# Patient Record
Sex: MALE | Race: BLACK OR AFRICAN AMERICAN | NOT HISPANIC OR LATINO | Employment: OTHER | URBAN - METROPOLITAN AREA
[De-identification: names, ages, dates, MRNs, and addresses within clinical notes are randomized per-mention and may not be internally consistent; named-entity substitution may affect disease eponyms.]

---

## 2017-06-15 ENCOUNTER — DOCUMENTATION ONLY (OUTPATIENT)
Dept: FAMILY MEDICINE | Facility: CLINIC | Age: 62
End: 2017-06-15

## 2017-06-15 NOTE — PROGRESS NOTES
Pre-Visit Chart Review  For Appointment Scheduled on 6-22-17    Health Maintenance Due   Topic Date Due    TETANUS VACCINE  02/26/1972    Hemoglobin A1c  10/12/2016    Colonoscopy  01/26/2017    Foot Exam  04/11/2017    Eye Exam  04/11/2017    Lipid Panel  04/12/2017

## 2017-06-22 ENCOUNTER — OFFICE VISIT (OUTPATIENT)
Dept: FAMILY MEDICINE | Facility: CLINIC | Age: 62
End: 2017-06-22
Payer: COMMERCIAL

## 2017-06-22 VITALS
HEART RATE: 84 BPM | BODY MASS INDEX: 34.13 KG/M2 | DIASTOLIC BLOOD PRESSURE: 84 MMHG | WEIGHT: 257.5 LBS | SYSTOLIC BLOOD PRESSURE: 136 MMHG | RESPIRATION RATE: 16 BRPM | HEIGHT: 73 IN | TEMPERATURE: 98 F | OXYGEN SATURATION: 97 %

## 2017-06-22 DIAGNOSIS — Z12.11 COLON CANCER SCREENING: ICD-10-CM

## 2017-06-22 DIAGNOSIS — E11.9 CONTROLLED TYPE 2 DIABETES MELLITUS WITHOUT COMPLICATION, WITHOUT LONG-TERM CURRENT USE OF INSULIN: ICD-10-CM

## 2017-06-22 DIAGNOSIS — Z76.89 ESTABLISHING CARE WITH NEW DOCTOR, ENCOUNTER FOR: Primary | ICD-10-CM

## 2017-06-22 DIAGNOSIS — M50.30 DDD (DEGENERATIVE DISC DISEASE), CERVICAL: ICD-10-CM

## 2017-06-22 PROCEDURE — 99999 PR PBB SHADOW E&M-EST. PATIENT-LVL IV: CPT | Mod: PBBFAC,,, | Performed by: FAMILY MEDICINE

## 2017-06-22 PROCEDURE — 99386 PREV VISIT NEW AGE 40-64: CPT | Mod: S$GLB,,, | Performed by: FAMILY MEDICINE

## 2017-06-22 RX ORDER — NAPROXEN 250 MG/1
250 TABLET ORAL DAILY PRN
COMMUNITY

## 2017-06-22 NOTE — PATIENT INSTRUCTIONS
Walking for Fitness  Fitness walking has something for everyone, even people who are already fit. Walking is one of the safest ways to condition your body aerobically. It can boost energy, help you lose weight, and reduce stress.    Physical benefits  · Walking strengthens your heart and lungs, and tones your muscles.  · When walking, your feet land with less impact than in other sports. This reduces chances of muscle, bone, and joint injury.  · Regular walking improves your cholesterol levels and lowers your risk of heart disease. And it helps you control your blood sugar if you have diabetes.  · Walking is a weight-bearing activity, which helps maintain bone density. This can help prevent osteoporosis.  Personal rewards  · Taking walks can help you relax and manage stress. And fitness walking may make you feel better about yourself.  · Walking can help you sleep better at night and make you less likely to be depressed.  · Regular walking may help maintain your memory as you get older.  · Walking is a great way to spend extra time with friends and family members. Be sure to invite your dog along!  Q&A about fitness walking  Q: Will walking keep me fit?  A: Yes. Regular walking at the right pace gives you all the benefits of other aerobic activities, such as jogging and swimming.  Q: Will walking help me lose weight and keep it off?  A: Yes. Per mile, walking can burn as many calories as jogging. Your health care provider can help work walking into your weight-loss plan.  Q: Is walking safe for my health?  A: Yes. Walking is safe if you have high blood pressure, diabetes, heart disease, or other conditions. Talk to your health care provider before you start.  Date Last Reviewed: 5/9/2015  © 0134-9685 [employer]. [address] [place] [address], [place] [address]. All rights reserved. This information is not intended as a substitute for professional medical care. Always follow your healthcare professional's  instructions.        Weight Management: Getting Started  Healthy bodies come in all shapes and sizes. Not all bodies are made to be thin. For some people, a healthy weight is higher than the average weight listed on weight charts. Your healthcare provider can help you decide on a healthy weight for you.    Reasons to lose weight  Losing weight can help with some health problems, such as high blood pressure, heart disease, diabetes, sleep apnea, and arthritis. You may also feel more energy.  Set your long-term goal  Your goal doesn't even have to be a specific weight. You may decide on a fitness goal (such as being able to walk 10 miles a week), or a health goal (such as lowering your blood pressure). Choose a goal that is measurable and reasonable, so you know when you've reached it. A goal of reaching a BMI of less than 25 is not always reasonable (or possible).   Make an action plan  Habits dont change overnight. Setting your goals too high can leave you feeling discouraged if you cant reach them. Be realistic. Choose one or two small changes you can make now. Set an action plan for how you are going to make these changes. When you can stick to this plan, keep making a few more small changes. Taking small steps will help you stay on the path to success.  Track your progress  Write down your goals. Then, keep a daily record of your progress. Write down what you eat and how active you are. This record lets you look back on how much youve done. It may also help when youre feeling frustrated. Reward yourself for success. Even if you dont reach every goal, give yourself credit for what you do get done.  Get support  Encouragement from others can help make losing weight easier. Ask your family members and friends for support. They may even want to join you. Also look to your healthcare provider, registered dietitian, and  for help. Your local hospital can give you more information about  nutrition, exercise, and weight loss.  Date Last Reviewed: 1/31/2016 © 2000-2016 "[employer]. [address] [place] [address], [place] [address]. All rights reserved. This information is not intended as a substitute for professional medical care. Always follow your healthcare professional's instructions.        Controlling High Blood Pressure  High blood pressure (hypertension) is often called the silent killer. This is because many people who have it dont know it. High blood pressure is defined as 140/90 mm Hg or higher. Know your blood pressure and remember to check it regularly. Doing so can save your life. Here are some things you can do to help control your blood pressure.    Choose heart-healthy foods  · Select low-salt, low-fat foods. Limit sodium intake to 2,400 mg per day or the amount suggested by your healthcare provider.  · Limit canned, dried, cured, packaged, and fast foods. These can contain a lot of salt.  · Eat 8 to 10 servings of fruits and vegetables every day.  · Choose lean meats, fish, or chicken.  · Eat whole-grain pasta, brown rice, and beans.  · Eat 2 to 3 servings of low-fat or fat-free dairy products.  · Ask your doctor about the DASH eating plan. This plan helps reduce blood pressure.  · When you go to a restaurant, ask that your meal be prepared with no added salt.  Maintain a healthy weight  · Ask your healthcare provider how many calories to eat a day. Then stick to that number.  · Ask your healthcare provider what weight range is healthiest for you. If you are overweight, a weight loss of only 3% to 5% of your body weight can help lower blood pressure. Generally, a good weight loss goal is to lose 10% of your body weight in a year.  · Limit snacks and sweets.  · Get regular exercise.  Get up and get active  · Choose activities you enjoy. Find ones you can do with friends or family. This includes bicycling, dancing, walking, and jogging.  · Park farther away from building  entrances.  · Use stairs instead of the elevator.  · When you can, walk or bike instead of driving.  · [place] leaves, garden, or do household repairs.  · Be active at a moderate to vigorous level of physical activity for at least 40 minutes for a minimum of 3 to 4 days a week.   Manage stress  · Make time to relax and enjoy life. Find time to laugh.  · Communicate your concerns with your loved ones and your healthcare provider.  · Visit with family and friends, and keep up with hobbies.  Limit alcohol and quit smoking  · Men should have no more than 2 drinks per day.  · Women should have no more than 1 drink per day.  · Talk with your healthcare provider about quitting smoking. Smoking significantly increases your risk for heart disease and stroke. Ask your healthcare provider about community smoking cessation programs and other options.  Medicines  If lifestyle changes arent enough, your healthcare provider may prescribe high blood pressure medicine. Take all medicines as prescribed. If you have any questions about your medicines, ask your healthcare provider before stopping or changing them.   Date Last Reviewed: 4/27/2016 © 2000-2016 [employer]. [address] [place] [address], [place] [address]. All rights reserved. This information is not intended as a substitute for professional medical care. Always follow your healthcare professional's instructions.

## 2017-06-22 NOTE — PROGRESS NOTES
Subjective:       Patient ID: Kel Hammond Sr. is a 62 y.o. male.    Chief Complaint: Establish Care    62-year-old male comes in to [facility].  He has a history of degenerative disc disease in the neck and declined surgery for 2 herniated disc.  Conservative treatment has managed to keep it under good control.  He has a history of type 2 diabetes controlled with diet and exercise.  He has arthritis of the knees and is status post a right anterior cruciate ligament repair and later a right total knee arthroplasty.  He is prehypertensive and has an extensive family history of diabetes and hypertension.  He has no active complaints at this time with fairly well controlled intrascapular and C7 area pain in the neck.  He takes the naproxen about once a week so it doesn't appear to be causing him any significant blood pressure issues.  His initial blood pressure was borderline the repeat was improved but still in the high normal range.    Past Medical History:  No date: DDD (degenerative disc disease), cervical  No date: Diabetes mellitus type II    Past Surgical History:  No date: JOINT REPLACEMENT      Comment: right total knee  No date: right ACL repair    Review of patient's family history indicates:    Diabetes                       Father                    Heart disease                  Father                    Diabetes                       Mother                    Hypertension                   Mother                    Diabetes                       Sister                    Hypertension                   Sister                    No Known Problems              Son                       Diabetes                       Maternal Aunt             Heart disease                  Maternal Aunt             Heart disease                  Maternal Uncle            Heart disease                  Paternal Aunt             Heart disease                  Paternal Uncle              Comment: x2    Birth defects                   Paternal Uncle            Heart disease                  Maternal Grandmother      Heart disease                  Maternal Grandfather      Heart disease                  Paternal Grandmother      Heart disease                  Paternal Grandfather      Meningitis                     Brother                   Social History    Marital status:              Spouse name:                       Years of education:                 Number of children:               Occupational History  Occupation          Employer            Comment               Tech support        [employer]               Social History Main Topics    Smoking status: Never Smoker                                                                Smokeless tobacco: Never Used                        Alcohol use: Yes                Comment: social / sometimes     Drug use: No                Current Outpatient Prescriptions:     aspirin (ECOTRIN) 81 MG EC tablet, Take 1 tablet by mouth Daily., Disp: , Rfl:     naproxen (NAPROSYN) 250 MG tablet, Take 250 mg by mouth daily as needed., Disp: , Rfl:     TETANUS VACCINE due on 02/26/1972  Colonoscopy due on 02/26/2004        Review of Systems   Constitutional: Negative for activity change, chills, fever and unexpected weight change.   HENT: Negative for congestion, hearing loss, rhinorrhea and trouble swallowing.    Eyes: Negative for discharge and visual disturbance.   Respiratory: Negative for chest tightness, shortness of breath and wheezing.    Cardiovascular: Negative for chest pain and palpitations.   Gastrointestinal: Negative for anal bleeding, blood in stool, constipation, diarrhea and vomiting.   Endocrine: Negative for polydipsia and polyuria.   Genitourinary: Negative for difficulty urinating, hematuria and urgency.   Musculoskeletal: Negative for arthralgias, joint swelling and neck pain.   Skin: Negative for color change and rash.   Neurological: Negative for weakness and headaches.    Psychiatric/Behavioral: Negative for confusion, dysphoric mood and sleep disturbance. The patient is not nervous/anxious.        Objective:      Physical Exam   Constitutional: He is oriented to person, place, and time. He appears well-developed. No distress.   Good blood pressure control  Obese with BMI of 34.4   HENT:   Head: Normocephalic and atraumatic.   Right Ear: External ear normal.   Left Ear: External ear normal.   Nose: Nose normal.   Mouth/Throat: Oropharynx is clear and moist. No oropharyngeal exudate.   Eyes: Conjunctivae and EOM are normal. Pupils are equal, round, and reactive to light. Right eye exhibits no discharge. Left eye exhibits no discharge. No scleral icterus.   Neck: Normal range of motion. Neck supple. No JVD present. No tracheal deviation present. No thyromegaly present.   Cardiovascular: Normal rate, regular rhythm and normal heart sounds.  Exam reveals no gallop and no friction rub.    No murmur heard.  Pulses:       Dorsalis pedis pulses are 2+ on the right side, and 2+ on the left side.        Posterior tibial pulses are 2+ on the right side, and 2+ on the left side.   Pulmonary/Chest: Effort normal and breath sounds normal. No stridor. No respiratory distress. He has no wheezes. He has no rales. He exhibits no tenderness.   Abdominal: Soft. Bowel sounds are normal. He exhibits no distension and no mass. There is no tenderness. There is no rebound and no guarding.   Musculoskeletal: Normal range of motion. He exhibits no edema.        Right foot: There is normal range of motion and no deformity.        Left foot: There is normal range of motion and no deformity.   Feet:   Right Foot:   Protective Sensation: 10 sites tested. 10 sites sensed.   Skin Integrity: Negative for ulcer, blister, skin breakdown, erythema, warmth, callus or dry skin.   Left Foot:   Protective Sensation: 10 sites tested. 10 sites sensed.   Skin Integrity: Negative for ulcer, blister, skin breakdown, erythema,  warmth or callus.   Lymphadenopathy:     He has no cervical adenopathy.   Neurological: He is alert and oriented to person, place, and time. He has normal reflexes. He displays normal reflexes. No cranial nerve deficit. He exhibits normal muscle tone.   Skin: Skin is warm and dry. Capillary refill takes less than 2 seconds. No rash noted. He is not diaphoretic. No erythema.   Good hair growth on feet and toes   Psychiatric: He has a normal mood and affect. His behavior is normal. Judgment and thought content normal.   Nursing note and vitals reviewed.      Assessment:       1. Establishing care with new doctor, encounter for    2. Controlled type 2 diabetes mellitus without complication, without long-term current use of insulin    3. DDD (degenerative disc disease), cervical    4. Colon cancer screening    5. BMI 34.0-34.9,adult        Plan:       1. Establishing care with new doctor, encounter for  - Comprehensive metabolic panel; Future  - CBC auto differential; Future  - Lipid panel; Future    2. Controlled type 2 diabetes mellitus without complication, without long-term current use of insulin  - Comprehensive metabolic panel; Future  - Lipid panel; Future  - Hemoglobin A1c; Future  - Microalbumin/creatinine urine ratio; Future    3. DDD (degenerative disc disease), cervical  Stable/asymptomatic    4. Colon cancer screening  - Occult Blood Stool, CA Screen; Future  - Occult Blood Stool, CA Screen; Future  - Occult Blood Stool, CA Screen; Future    5. BMI 34.0-34.9,adult         Patient readiness: acceptance and barriers:none    During the course of the visit the patient was educated and counseled about the following:     Diabetes:  Discussed general issues about diabetes pathophysiology and management.  Discussed foot care.  Reminded to get yearly retinal exam.  Obesity:   General weight loss/lifestyle modification strategies discussed (elicit support from others; identify saboteurs; non-food rewards,  etc).  Informal exercise measures discussed, e.g. taking stairs instead of elevator.  Regular aerobic exercise program discussed.    Goals: Diabetes: Maintain Hemoglobin A1C below 7 and Obesity: Reduce calorie intake and BMI    Did patient meet goals/outcomes: Yes    The following self management tools provided: blood glucose log  excercise log    Patient Instructions (the written plan) was given to the patient/family.     Time spent with patient: 45 minutes

## 2017-06-23 ENCOUNTER — PATIENT MESSAGE (OUTPATIENT)
Dept: FAMILY MEDICINE | Facility: CLINIC | Age: 62
End: 2017-06-23

## 2017-06-23 ENCOUNTER — LAB VISIT (OUTPATIENT)
Dept: LAB | Facility: HOSPITAL | Age: 62
End: 2017-06-23
Attending: FAMILY MEDICINE
Payer: COMMERCIAL

## 2017-06-23 DIAGNOSIS — Z76.89 ESTABLISHING CARE WITH NEW DOCTOR, ENCOUNTER FOR: ICD-10-CM

## 2017-06-23 DIAGNOSIS — E11.9 CONTROLLED TYPE 2 DIABETES MELLITUS WITHOUT COMPLICATION, WITHOUT LONG-TERM CURRENT USE OF INSULIN: ICD-10-CM

## 2017-06-23 LAB
ALBUMIN SERPL BCP-MCNC: 3.6 G/DL
ALP SERPL-CCNC: 60 U/L
ALT SERPL W/O P-5'-P-CCNC: 22 U/L
ANION GAP SERPL CALC-SCNC: 7 MMOL/L
AST SERPL-CCNC: 20 U/L
BASOPHILS # BLD AUTO: 0.03 K/UL
BASOPHILS NFR BLD: 0.4 %
BILIRUB SERPL-MCNC: 1.1 MG/DL
BUN SERPL-MCNC: 14 MG/DL
CALCIUM SERPL-MCNC: 9.5 MG/DL
CHLORIDE SERPL-SCNC: 103 MMOL/L
CHOLEST/HDLC SERPL: 4.5 {RATIO}
CO2 SERPL-SCNC: 29 MMOL/L
CREAT SERPL-MCNC: 1 MG/DL
DIFFERENTIAL METHOD: ABNORMAL
EOSINOPHIL # BLD AUTO: 0.6 K/UL
EOSINOPHIL NFR BLD: 8.9 %
ERYTHROCYTE [DISTWIDTH] IN BLOOD BY AUTOMATED COUNT: 13.8 %
EST. GFR  (AFRICAN AMERICAN): >60 ML/MIN/1.73 M^2
EST. GFR  (NON AFRICAN AMERICAN): >60 ML/MIN/1.73 M^2
ESTIMATED AVG GLUCOSE: 117 MG/DL
GLUCOSE SERPL-MCNC: 105 MG/DL
HBA1C MFR BLD HPLC: 5.7 %
HCT VFR BLD AUTO: 44.7 %
HDL/CHOLESTEROL RATIO: 22 %
HDLC SERPL-MCNC: 186 MG/DL
HDLC SERPL-MCNC: 41 MG/DL
HGB BLD-MCNC: 14.8 G/DL
LDLC SERPL CALC-MCNC: 114 MG/DL
LYMPHOCYTES # BLD AUTO: 2.3 K/UL
LYMPHOCYTES NFR BLD: 33.6 %
MCH RBC QN AUTO: 27.5 PG
MCHC RBC AUTO-ENTMCNC: 33.1 %
MCV RBC AUTO: 83 FL
MONOCYTES # BLD AUTO: 0.5 K/UL
MONOCYTES NFR BLD: 6.6 %
NEUTROPHILS # BLD AUTO: 3.4 K/UL
NEUTROPHILS NFR BLD: 50.5 %
NONHDLC SERPL-MCNC: 145 MG/DL
PLATELET # BLD AUTO: 205 K/UL
PMV BLD AUTO: 9.5 FL
POTASSIUM SERPL-SCNC: 4.4 MMOL/L
PROT SERPL-MCNC: 6.8 G/DL
RBC # BLD AUTO: 5.39 M/UL
SODIUM SERPL-SCNC: 139 MMOL/L
TRIGL SERPL-MCNC: 155 MG/DL
WBC # BLD AUTO: 6.82 K/UL

## 2017-06-23 PROCEDURE — 36415 COLL VENOUS BLD VENIPUNCTURE: CPT | Mod: PO

## 2017-06-23 PROCEDURE — 80061 LIPID PANEL: CPT

## 2017-06-23 PROCEDURE — 83036 HEMOGLOBIN GLYCOSYLATED A1C: CPT

## 2017-06-23 PROCEDURE — 85025 COMPLETE CBC W/AUTO DIFF WBC: CPT

## 2017-06-23 PROCEDURE — 80053 COMPREHEN METABOLIC PANEL: CPT

## 2017-07-25 ENCOUNTER — LAB VISIT (OUTPATIENT)
Dept: LAB | Facility: HOSPITAL | Age: 62
End: 2017-07-25
Attending: FAMILY MEDICINE
Payer: COMMERCIAL

## 2017-07-25 DIAGNOSIS — Z12.11 COLON CANCER SCREENING: ICD-10-CM

## 2017-07-25 PROCEDURE — 82270 OCCULT BLOOD FECES: CPT | Mod: 91

## 2017-07-26 ENCOUNTER — PATIENT MESSAGE (OUTPATIENT)
Dept: FAMILY MEDICINE | Facility: CLINIC | Age: 62
End: 2017-07-26

## 2017-07-26 LAB
OB PNL STL: NEGATIVE

## 2019-03-14 DIAGNOSIS — Z12.11 COLON CANCER SCREENING: ICD-10-CM

## 2019-04-22 ENCOUNTER — CLINICAL SUPPORT (OUTPATIENT)
Dept: URGENT CARE | Facility: CLINIC | Age: 64
End: 2019-04-22

## 2019-04-22 PROCEDURE — 80305 DRUG TEST PRSMV DIR OPT OBS: CPT | Mod: S$GLB,,, | Performed by: EMERGENCY MEDICINE

## 2019-04-22 PROCEDURE — 80305 PR DRUG SCREEN - 1: ICD-10-PCS | Mod: S$GLB,,, | Performed by: EMERGENCY MEDICINE

## 2020-09-02 DIAGNOSIS — C61 MALIGNANT NEOPLASM OF PROSTATE: Primary | ICD-10-CM

## 2020-09-05 ENCOUNTER — LAB VISIT (OUTPATIENT)
Dept: LAB | Facility: HOSPITAL | Age: 65
End: 2020-09-05
Attending: SPECIALIST
Payer: MEDICARE

## 2020-09-05 DIAGNOSIS — C61 MALIGNANT NEOPLASM OF PROSTATE: Primary | ICD-10-CM

## 2020-09-05 LAB
ANION GAP SERPL CALC-SCNC: 7 MMOL/L (ref 8–16)
BUN SERPL-MCNC: 12 MG/DL (ref 8–23)
CALCIUM SERPL-MCNC: 8.9 MG/DL (ref 8.7–10.5)
CHLORIDE SERPL-SCNC: 104 MMOL/L (ref 95–110)
CO2 SERPL-SCNC: 28 MMOL/L (ref 23–29)
CREAT SERPL-MCNC: 1 MG/DL (ref 0.5–1.4)
EST. GFR  (AFRICAN AMERICAN): >60 ML/MIN/1.73 M^2
EST. GFR  (NON AFRICAN AMERICAN): >60 ML/MIN/1.73 M^2
GLUCOSE SERPL-MCNC: 112 MG/DL (ref 70–110)
POTASSIUM SERPL-SCNC: 4.1 MMOL/L (ref 3.5–5.1)
SODIUM SERPL-SCNC: 139 MMOL/L (ref 136–145)

## 2020-09-05 PROCEDURE — 36415 COLL VENOUS BLD VENIPUNCTURE: CPT

## 2020-09-05 PROCEDURE — 80048 BASIC METABOLIC PNL TOTAL CA: CPT

## 2020-09-08 ENCOUNTER — HOSPITAL ENCOUNTER (OUTPATIENT)
Dept: RADIOLOGY | Facility: HOSPITAL | Age: 65
Discharge: HOME | End: 2020-09-08
Attending: SPECIALIST
Payer: MEDICARE

## 2020-09-08 DIAGNOSIS — C61 MALIGNANT NEOPLASM OF PROSTATE: ICD-10-CM

## 2020-09-08 PROCEDURE — A9503 TC99M MEDRONATE: HCPCS

## 2020-09-11 ENCOUNTER — SOCIAL WORK (OUTPATIENT)
Dept: HEMATOLOGY/ONCOLOGY | Facility: CLINIC | Age: 65
End: 2020-09-11

## 2020-09-11 ENCOUNTER — OFFICE VISIT (OUTPATIENT)
Dept: RADIATION ONCOLOGY | Facility: CLINIC | Age: 65
End: 2020-09-11
Payer: MEDICARE

## 2020-09-11 VITALS
WEIGHT: 269.31 LBS | BODY MASS INDEX: 36.02 KG/M2 | HEART RATE: 66 BPM | OXYGEN SATURATION: 98 % | SYSTOLIC BLOOD PRESSURE: 134 MMHG | DIASTOLIC BLOOD PRESSURE: 84 MMHG | TEMPERATURE: 98 F

## 2020-09-11 DIAGNOSIS — C61 MALIGNANT NEOPLASM OF PROSTATE: ICD-10-CM

## 2020-09-11 PROCEDURE — 99205 PR OFFICE/OUTPT VISIT, NEW, LEVL V, 60-74 MIN: ICD-10-PCS | Mod: S$GLB,,, | Performed by: RADIOLOGY

## 2020-09-11 PROCEDURE — 99205 OFFICE O/P NEW HI 60 MIN: CPT | Mod: S$GLB,,, | Performed by: RADIOLOGY

## 2020-09-11 RX ORDER — MULTIVITAMIN
2 TABLET ORAL
COMMUNITY

## 2020-09-11 RX ORDER — LISINOPRIL 10 MG/1
10 TABLET ORAL DAILY
COMMUNITY
Start: 2020-09-03

## 2020-09-11 NOTE — PATIENT INSTRUCTIONS
Radiation to the male pelvis and skin care instruction sheet given along with [facility] Booklet.  Bowel and bladder prep instructions given.  Patient and spouse verbalized understanding.

## 2020-09-11 NOTE — PROGRESS NOTES
I met with patient and his wife to complete new patient orientation and the NCCN Distress Screening; patient indicated a rating of 1.  Patient stated that he uses a C-Pap while sleeping.  He denied needing psychosocial support at this time.  I provided my contact information in the event he needs supportive services in the future.

## 2020-09-11 NOTE — PROGRESS NOTES
Kel Hammond Sr.  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 Hammond is a 65M who recently p/w a rising PSA and underwent TRUS w/ CNBx that showed Grp2 adenoCA in 2 cores, thus he has now been referred to [facility] for discussion of definitive tx options. Recent bone scan was (-) for DM's. His insurance did not cover the ordered CT abd/pelvis.  He denies any bony pain, wt loss, hematuria, retention, or incontinence.    IPSS:  4/35   Mild dysuria  QoL:   0/6 Delighted  Hao:  1/25 Severe ED  [chronic for >10 yrs unchanged]      TRUS w/ CNBx (8/24/2020):              Bone scan (9/8/2020):          REVIEW OF SYSTEMS:  A complete ROS was performed and the patient denies any acute changes/concerns other than per HPI.    Past Medical History:   Diagnosis Date    DDD (degenerative disc disease), cervical     Diabetes mellitus type II      Past Surgical History:   Procedure Laterality Date    JOINT REPLACEMENT      right total knee    right ACL repair       Social History     Socioeconomic History    Marital status:      Spouse name: Not on file    Number of children: Not on file    Years of education: Not on file    Highest education level: Not on file   Occupational History    Occupation: Tech support     Employer: [employer]   Social Needs    Financial resource strain: Not on file    Food insecurity     Worry: Not on file     Inability: Not on file    Transportation needs     Medical: Not on file     Non-medical: Not on file   Tobacco Use    Smoking status: Never Smoker    Smokeless tobacco: Never Used   Substance and Sexual Activity    Alcohol use: Yes     Comment: social / sometimes     Drug use: No    Sexual activity: Not on file   Lifestyle    Physical activity     Days per week: Not on file     Minutes per session: Not on file    Stress: Not on file    Relationships    Social connections     Talks on phone: Not on file     Gets together: Not on file     Attends Voodoo service: Not on file     Active member of club or organization: Not on file     Attends meetings of clubs or organizations: Not on file     Relationship status: Not on file   Other Topics Concern    Not on file   Social History Narrative    Not on file     Family History   Problem Relation Age of Onset    Diabetes Father     Heart disease Father     Diabetes Mother     Hypertension Mother     Diabetes Sister     Hypertension Sister     No Known Problems Son     Diabetes Maternal Aunt     Heart disease Maternal Aunt     Heart disease Maternal Uncle     Heart disease Paternal Aunt     Heart disease Paternal Uncle         x2    Birth defects Paternal Uncle     Heart disease Maternal Grandmother     Heart disease Maternal Grandfather     Heart disease Paternal Grandmother     Heart disease Paternal Grandfather     Meningitis Brother        PRIOR HISTORY OF CHEMOTHERAPY OR RADIOTHERAPY: Please see HPI for patients prior oncologic history.    Medication List with Changes/Refills   Current Medications    ASPIRIN (ECOTRIN) 81 MG EC TABLET    Take 1 tablet by mouth Daily.    LISINOPRIL 10 MG TABLET    Take 10 mg by mouth once daily. Take 10 mg by mouth once daily.    MULTIVITAMIN (THERAGRAN) PER TABLET    Take 2 tablets by mouth. Take 2 tablets by mouth.    NAPROXEN (NAPROSYN) 250 MG TABLET    Take 250 mg by mouth daily as needed.     Review of patient's allergies indicates:  No Known Allergies    QUALITY OF LIFE: 90%- Able to Carry on Normal Activity: Minor Symptoms of Disease    Vitals:    09/11/20 0908   BP: 134/84   Pulse: 66   Temp: 98.3 °F (36.8 °C)   TempSrc: Oral   SpO2: 98%   Weight: 122.2 kg (269 lb 4.8 oz)   PainSc: 0-No pain     Body mass index is 36.02 kg/m².    PHYSICAL EXAM:   GENERAL: alert; in no apparent distress.   HEAD: normocephalic, atraumatic.  EYES: pupils  are equal, round, reactive to light and accommodation. Sclera anicteric. Conjunctiva not injected.   NOSE/THROAT: no nasal erythema or rhinorrhea. Oropharynx pink, without erythema, ulcerations or thrush.   NECK: no cervical motion rigidity; supple with no masses.  CHEST: clear to auscultation bilaterally; no wheezes, crackles or rubs. Patient is speaking comfortably on room air with normal work of breathing without using accessory muscles of respiration.  CARDIOVASCULAR: regular rate and rhythm; no murmurs, rubs or gallops.  ABDOMEN: soft, nontender, nondistended. Bowel sounds present.   MUSCULOSKELETAL: no tenderness to palpation along the spine or scapulae. Normal range of motion.  NEUROLOGIC: cranial nerves II-XII intact bilaterally. Strength 5/5 in bilateral upper and lower extremities. No sensory deficits appreciated. Reflexes globally intact. No cerebellar signs. Normal gait.  LYMPHATIC: no cervical, supraclavicular or axillary adenopathy appreciated bilaterally.   EXTREMITIES: no clubbing, cyanosis, edema.  SKIN: no erythema, rashes, ulcerations noted.     REVIEW OF IMAGING/PATHOLOGY/LABS: Please see HPI. All images reviewed personally by dictating physician.       ASSESSMENT: Kel Hammond Sr. is a 65 y.o. male with favorable intermediate-risk prostate adenoCA  [[id number] - Grp2 - PSA: 5.1].    PLAN:  I spent over one hour in consultation with the patient discussing the rationales, risks, benefits, and alternatives for definitive therapy via RP or RT in this setting.  We also reviewed in detail the options of Active Surveillance and the results of the recently published ProTect Trial [Luca, et al. & Malcom et al.  [facility]  2016], as this can be an appropriate options for favorable int-risk patients, however the patient did not favor the option/details/risks of AS.    We then reviewed the comparable outcomes between the appropriate definitive treatment options for his disease, RP and RT, as well as the  differing details in each of these therapys toxicity risks. If deemed medically operable and safe for anesthesia, then either of these options are appropriate for this patient, however his age and recommendation of RT by his urologist support the option of RT for him over RP.  It was highlighted that RT may possibly be indicated after RP, and the possible reasons for this, such as (+)SM/YAMILETH/SVI,  were discussed.  The difference between brachytherapy and EBRT were also discussed with the latter being available here and the former being by referral to an OSH in [facility], , or [place].    The patient questioned his need to have ADT along with his RT (mostly due to a costly copay), thus we reviewed the NCCN guidelines for favorable intermediate risk prostate cancer which do not indicate the addition of ADT to RT to be necessary, thus I recommended that he discuss this in detail w/ Dr. Bustamante as he may have specific reasons for recommending this for him.    We then further discussed EBRT, wherein an education video was played to portray the logistics of therapy, and we then reviewed in detail the potential toxicities, including but not limited to fatigue, skin irritation/erythema/desquamation, pain, worsening of dysuria, diarrhea, irritation of hemorrhoids, acute/chronic proctitis leading to rectal bleeding/discomfort and possible need for procedural intervention, cystitis with potential for perforation/fistulization requiring procedure intervention, erectile dysfunction, sterility, late urethral narrowing and/or stricture causing urinary retention and requiring procedural dilation or catheterization, increased risk of hip fracture, and secondary malignancy.  It was also discussed that fiducial markers would need to be placed in his prostate gland for IGRT prior to initiating RT.  I carefully explained the process of simulation and treatment delivery with weekly physician visits.     At this time, the patient would like  to proceed with definitive EBRT.  RT dose/regimen is expected to by daily IMRT for ~8-9 weeks, pending dosimetric analysis.  The patient verbalized understanding of the above plan, risks, benefits, and details, and informed consent was obtained.  We will proceed with RTP-CT imaging after fiducial placement with plans to initiate RT w/in the next 1-2 weeks.  Contact info was exchanged, and the patient was encouraged to contact our clinic with any questions, needs, or concerns.    DISPOSITION: RTC FOR CT SIM after fiducial placement    I have personally seen and evaluated this patient. Greater than 50% of this time was spent discussing coordination of care and/or counseling.    PHYSICIAN: Jared Mendes III, MD    Thank you for the opportunity to meet and consult with Kel Hammond Sr..   Please feel free to contact me to discuss the above recommendation further.

## 2020-10-02 ENCOUNTER — TELEPHONE (OUTPATIENT)
Dept: RADIATION ONCOLOGY | Facility: CLINIC | Age: 65
End: 2020-10-02

## 2020-10-02 DIAGNOSIS — Z03.818 ENCOUNTER FOR OBSERVATION FOR SUSPECTED EXPOSURE TO OTHER BIOLOGICAL AGENTS RULED OUT: ICD-10-CM

## 2020-10-02 DIAGNOSIS — Z03.818 ENCNTR FOR OBS FOR SUSP EXPSR TO OTH BIOLG AGENTS RULED OUT: Primary | ICD-10-CM

## 2020-10-22 ENCOUNTER — LAB VISIT (OUTPATIENT)
Dept: INFUSION THERAPY | Facility: HOSPITAL | Age: 65
End: 2020-10-22
Attending: NURSE PRACTITIONER
Payer: MEDICARE

## 2020-10-22 DIAGNOSIS — Z03.818 ENCNTR FOR OBS FOR SUSP EXPSR TO OTH BIOLG AGENTS RULED OUT: ICD-10-CM

## 2020-10-22 DIAGNOSIS — Z03.818 ENCOUNTER FOR OBSERVATION FOR SUSPECTED EXPOSURE TO OTHER BIOLOGICAL AGENTS RULED OUT: ICD-10-CM

## 2020-10-22 LAB — SARS-COV-2 RNA RESP QL NAA+PROBE: NOT DETECTED

## 2020-10-22 PROCEDURE — U0003 INFECTIOUS AGENT DETECTION BY NUCLEIC ACID (DNA OR RNA); SEVERE ACUTE RESPIRATORY SYNDROME CORONAVIRUS 2 (SARS-COV-2) (CORONAVIRUS DISEASE [COVID-19]), AMPLIFIED PROBE TECHNIQUE, MAKING USE OF HIGH THROUGHPUT TECHNOLOGIES AS DESCRIBED BY CMS-2020-01-R: HCPCS

## 2020-11-22 DIAGNOSIS — C61 MALIGNANT NEOPLASM OF PROSTATE: Primary | ICD-10-CM

## 2020-11-22 RX ORDER — TAMSULOSIN HYDROCHLORIDE 0.4 MG/1
0.4 CAPSULE ORAL DAILY
Qty: 90 CAPSULE | Refills: 2 | Status: SHIPPED | OUTPATIENT
Start: 2020-11-22 | End: 2020-11-30

## 2020-11-30 DIAGNOSIS — C61 MALIGNANT NEOPLASM OF PROSTATE: ICD-10-CM

## 2020-11-30 RX ORDER — TAMSULOSIN HYDROCHLORIDE 0.4 MG/1
0.4 CAPSULE ORAL DAILY
Qty: 90 CAPSULE | Refills: 2 | Status: SHIPPED | OUTPATIENT
Start: 2020-11-30 | End: 2021-12-21

## 2020-12-10 ENCOUNTER — TELEPHONE (OUTPATIENT)
Dept: RADIATION ONCOLOGY | Facility: CLINIC | Age: 65
End: 2020-12-10

## 2020-12-10 DIAGNOSIS — C61 MALIGNANT NEOPLASM OF PROSTATE: Primary | ICD-10-CM

## 2020-12-14 ENCOUNTER — TREATMENT (OUTPATIENT)
Dept: RADIATION ONCOLOGY | Facility: CLINIC | Age: 65
End: 2020-12-14
Payer: MEDICARE

## 2020-12-14 PROCEDURE — 77336 PR  RADN PHYSICS CONSULT CONTINUING: ICD-10-PCS | Mod: S$GLB,,, | Performed by: RADIOLOGY

## 2020-12-14 PROCEDURE — G6015 PR RADN TX DELIVERY,  INTENS MOD, 1+ FIELDS PER TX: ICD-10-PCS | Mod: S$GLB,,, | Performed by: RADIOLOGY

## 2020-12-14 PROCEDURE — 77014 PR  CT GUIDANCE PLACEMENT RAD THERAPY FIELDS: CPT | Mod: S$GLB,,, | Performed by: RADIOLOGY

## 2020-12-14 PROCEDURE — G6015 RADIATION TX DELIVERY IMRT: HCPCS | Mod: S$GLB,,, | Performed by: RADIOLOGY

## 2020-12-14 PROCEDURE — 77336 RADIATION PHYSICS CONSULT: CPT | Mod: S$GLB,,, | Performed by: RADIOLOGY

## 2020-12-14 PROCEDURE — 77014 PR  CT GUIDANCE PLACEMENT RAD THERAPY FIELDS: ICD-10-PCS | Mod: S$GLB,,, | Performed by: RADIOLOGY

## 2021-01-04 ENCOUNTER — DOCUMENTATION ONLY (OUTPATIENT)
Dept: RADIATION ONCOLOGY | Facility: CLINIC | Age: 66
End: 2021-01-04

## 2021-05-06 ENCOUNTER — PATIENT MESSAGE (OUTPATIENT)
Dept: RESEARCH | Facility: HOSPITAL | Age: 66
End: 2021-05-06

## 2021-07-19 ENCOUNTER — OFFICE VISIT (OUTPATIENT)
Dept: RADIATION ONCOLOGY | Facility: CLINIC | Age: 66
End: 2021-07-19
Payer: MEDICARE

## 2021-07-19 ENCOUNTER — TELEPHONE (OUTPATIENT)
Dept: RADIATION ONCOLOGY | Facility: CLINIC | Age: 66
End: 2021-07-19

## 2021-07-19 VITALS
BODY MASS INDEX: 35.23 KG/M2 | WEIGHT: 263.38 LBS | TEMPERATURE: 98 F | DIASTOLIC BLOOD PRESSURE: 83 MMHG | OXYGEN SATURATION: 97 % | SYSTOLIC BLOOD PRESSURE: 138 MMHG | HEART RATE: 66 BPM

## 2021-07-19 DIAGNOSIS — C61 MALIGNANT NEOPLASM OF PROSTATE: Primary | ICD-10-CM

## 2021-07-19 PROCEDURE — 99214 PR OFFICE/OUTPT VISIT, EST, LEVL IV, 30-39 MIN: ICD-10-PCS | Mod: S$GLB,,, | Performed by: RADIOLOGY

## 2021-07-19 PROCEDURE — 99214 OFFICE O/P EST MOD 30 MIN: CPT | Mod: S$GLB,,, | Performed by: RADIOLOGY

## 2022-01-06 ENCOUNTER — LAB VISIT (OUTPATIENT)
Dept: LAB | Facility: HOSPITAL | Age: 67
End: 2022-01-06
Attending: RADIOLOGY
Payer: MEDICARE

## 2022-01-06 DIAGNOSIS — C61 MALIGNANT NEOPLASM OF PROSTATE: ICD-10-CM

## 2022-01-06 LAB — COMPLEXED PSA SERPL-MCNC: 0.63 NG/ML (ref 0–4)

## 2022-01-06 PROCEDURE — 36415 COLL VENOUS BLD VENIPUNCTURE: CPT | Performed by: RADIOLOGY

## 2022-01-06 PROCEDURE — 84153 ASSAY OF PSA TOTAL: CPT | Performed by: RADIOLOGY

## 2022-01-19 ENCOUNTER — OFFICE VISIT (OUTPATIENT)
Dept: RADIATION ONCOLOGY | Facility: CLINIC | Age: 67
End: 2022-01-19
Payer: MEDICARE

## 2022-01-19 VITALS
OXYGEN SATURATION: 98 % | SYSTOLIC BLOOD PRESSURE: 149 MMHG | BODY MASS INDEX: 37.99 KG/M2 | WEIGHT: 284 LBS | HEART RATE: 63 BPM | DIASTOLIC BLOOD PRESSURE: 80 MMHG | RESPIRATION RATE: 16 BRPM

## 2022-01-19 DIAGNOSIS — C61 MALIGNANT NEOPLASM OF PROSTATE: Primary | ICD-10-CM

## 2022-01-19 PROCEDURE — 99214 PR OFFICE/OUTPT VISIT, EST, LEVL IV, 30-39 MIN: ICD-10-PCS | Mod: S$GLB,,, | Performed by: RADIOLOGY

## 2022-01-19 PROCEDURE — 99214 OFFICE O/P EST MOD 30 MIN: CPT | Mod: S$GLB,,, | Performed by: RADIOLOGY

## 2022-01-19 NOTE — PROGRESS NOTES
Kel Hammond .  [id number]  1955 1/19/2022  No referring provider defined for this encounter.    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    HISTORY OF PRESENT ILLNESS:   Mr. Hammond is a 66M who recently p/w a rising PSA and underwent TRUS w/ CNBx that showed Grp2 adenoCA in 2 cores, thus he has now been referred to [facility] for discussion of definitive tx options. Recent bone scan was (-) for DM's. His insurance did not cover the ordered CT abd/pelvis.  He denies any bony pain, wt loss, hematuria, retention, or incontinence.    IPSS:  4/35   Mild dysuria  QoL:   0/6 Delighted  Hao:  1/25 Severe ED  [chronic for >10 yrs unchanged]      TRUS w/ CNBx (8/24/2020):              Bone scan (9/8/2020):    Patient completed radiotherapy alone, moderately hypo fractionated 70 Gy in 28 fractions on December 14, 2020.  Patient had mild symptoms of radiation cystitis that were manageable with Flomax twice daily as well as hemorrhoidal irritation requiring preparation H.    INTERVAL HISTORY:   Today patient denies dysuria, hematuria, diarrhea, bright red blood per rectum, bone pain.  He denies urine or stool incontinence.  He presents with updated PSAs.  Remains on Flomax 0.4 mg.    AUA 2  QOL 1  IIEF 1    Review of systems otherwise negative unless indicated in HPI/interval history.    Past Medical History:   Diagnosis Date    DDD (degenerative disc disease), cervical     Diabetes mellitus type II     Prostate cancer      Past Surgical History:   Procedure Laterality Date    JOINT REPLACEMENT      right total knee    right ACL repair       Social History     Socioeconomic History    Marital status:    Occupational History    Occupation: Tech support     Employer: [employer]   Tobacco Use    Smoking status: Never Smoker    Smokeless tobacco: Never Used   Substance and Sexual Activity    Alcohol use: Yes      Comment: social / sometimes     Drug use: No     Family History   Problem Relation Age of Onset    Diabetes Father     Heart disease Father     Diabetes Mother     Hypertension Mother     Diabetes Sister     Hypertension Sister     No Known Problems Son     Diabetes Maternal Aunt     Heart disease Maternal Aunt     Heart disease Maternal Uncle     Heart disease Paternal Aunt     Heart disease Paternal Uncle         x2    Birth defects Paternal Uncle     Heart disease Maternal Grandmother     Heart disease Maternal Grandfather     Heart disease Paternal Grandmother     Heart disease Paternal Grandfather     Meningitis Brother      Medication List with Changes/Refills   Current Medications    ASPIRIN (ECOTRIN) 81 MG EC TABLET    Take 1 tablet by mouth Daily.    LISINOPRIL 10 MG TABLET    Take 10 mg by mouth once daily. Take 10 mg by mouth once daily.    MULTIVITAMIN (THERAGRAN) PER TABLET    Take 2 tablets by mouth. Take 2 tablets by mouth.    NAPROXEN (NAPROSYN) 250 MG TABLET    Take 250 mg by mouth daily as needed.    TAMSULOSIN (FLOMAX) 0.4 MG CAP    TAKE 1 CAPSULE BY MOUTH EVERY DAY     Review of patient's allergies indicates:  No Known Allergies    QUALITY OF LIFE: 100%- Normal, No Complaints, No Evidence of Disease    Vitals:    01/19/22 1323   BP: (!) 149/80   Pulse: 63   Resp: 16   SpO2: 98%   Weight: 128.8 kg (284 lb)   PainSc: 0-No pain     Body mass index is 37.99 kg/m².    PHYSICAL EXAM:   GENERAL: alert; in no apparent distress.   HEAD: normocephalic, atraumatic.  EYES: pupils are equal, round, reactive to light and accommodation. Sclera anicteric. Conjunctiva not injected.   NECK: no cervical motion rigidity; supple with no masses.  CHEST: Patient is speaking comfortably on room air with normal work of breathing without using accessory muscles of respiration.  ABDOMEN: soft, nontender, nondistended.   MUSCULOSKELETAL: no tenderness to palpation along the spine or scapulae. Normal  range of motion.  NEUROLOGIC: cranial nerves II-XII intact bilaterally. Strength 5/5 in bilateral upper and lower extremities. No sensory deficits appreciated.  Normal gait.  EXTREMITIES: no clubbing, cyanosis, edema.  SKIN: no erythema, rashes, ulcerations noted.     ANCILLARY DATA:   PSA  2/21 1.7  5/21 1.1  1/22 0.63    ASSESSMENT: 66 y.o. male with favorable intermediate-risk prostate adenoCA  [[id number] - Grp2 - PSA: 5.1] who completed radiotherapy alone, moderately hypo fractionated 70 Gy in 28 fractions on December 14, 2020.   PLAN:   Kel Hammond  presents without complaints.  PSA has declined to 0.63 and likely has not yet reached lucila.  Will place orders for repeat in 6 months and have him return to clinic at that time.  Continue to follow with Dr. Bustamante.    All questions answered and contact information provided. Patient understands free to call us anytime with any questions or concerns regarding radiation therapy.    I have personally seen and evaluated this patient with a moderate to high complexity diagnosis.      Greater than 30 minutes were dedicated to reviewing/interpreting pertinent laboratory/imaging/pathology as well as follow-up with concurrent consultants; reviewing and performing history and physical; counseling patient on continuing oncologic recommendations; documentation in the electronic medical record including ordering of additional tests and/or radiation treatment protocol; and coordination of care with physicians with referrals placed as appropriate.     COVID-19 precautions discussed. Cancer Center policy for COVID-19 testing described.  Patient will be required to wear a mask when in the Cancer Center.    PHYSICIAN: Danny Baires Jr, MD

## 2022-03-25 ENCOUNTER — TELEPHONE (OUTPATIENT)
Dept: ORTHOPEDICS | Facility: CLINIC | Age: 67
End: 2022-03-25
Payer: COMMERCIAL

## 2022-03-25 DIAGNOSIS — Z96.651 HISTORY OF RIGHT KNEE JOINT REPLACEMENT: Primary | ICD-10-CM

## 2022-03-25 NOTE — TELEPHONE ENCOUNTER
Spoke to patient, He has an exactech recall. Scheduled xray as requested. Patient agreeable.       ----- Message from Nga Andrews MA sent at 3/25/2022  3:30 PM CDT -----  This is a [facility] pt.  ----- Message -----  From: Perri Tilley  Sent: 3/25/2022   3:24 PM CDT  To: Valdo PURDY Staff, Pineda MARROQUIN Staff, #    Good afternoon,    This patient is incorrectly scheduled with Dr. Daniel for Monday for a TKA consult. Would any of your providers be able to see the patient?    Thank you-    Perri Tilley MS, OTC  Clinical/OR Assistant to Jazmine Daniel MD  [facility]

## 2022-03-30 ENCOUNTER — TELEPHONE (OUTPATIENT)
Dept: ORTHOPEDICS | Facility: CLINIC | Age: 67
End: 2022-03-30
Payer: COMMERCIAL

## 2022-03-30 ENCOUNTER — HOSPITAL ENCOUNTER (OUTPATIENT)
Dept: RADIOLOGY | Facility: HOSPITAL | Age: 67
Discharge: HOME | End: 2022-03-30
Attending: NURSE PRACTITIONER
Payer: MEDICARE

## 2022-03-30 DIAGNOSIS — Z96.651 HISTORY OF RIGHT KNEE JOINT REPLACEMENT: ICD-10-CM

## 2022-03-30 PROCEDURE — 73560 X-RAY EXAM OF KNEE 1 OR 2: CPT | Mod: TC,PO,LT

## 2022-03-30 PROCEDURE — 73562 X-RAY EXAM OF KNEE 3: CPT | Mod: TC,PO,RT

## 2022-03-30 NOTE — TELEPHONE ENCOUNTER
----- Message from Patti Roberts PA-C sent at 3/30/2022  2:41 PM CDT -----    ----- Message -----  From: Interface, Rad Results In  Sent: 3/30/2022   9:24 AM CDT  To: Delaney Barrios NP

## 2022-03-30 NOTE — TELEPHONE ENCOUNTER
Called and spoke with patient regarding xray.  He reports increased pain and swelling in the knee for the last 3 years.  Message sent to staff for him to be scheduled in clinic.

## 2022-04-11 ENCOUNTER — PATIENT MESSAGE (OUTPATIENT)
Dept: ORTHOPEDICS | Facility: CLINIC | Age: 67
End: 2022-04-11
Payer: COMMERCIAL

## 2022-08-31 DIAGNOSIS — R31.29 MICROSCOPIC HEMATURIA: ICD-10-CM

## 2022-08-31 DIAGNOSIS — G89.29 PAIN, CHRONIC: ICD-10-CM

## 2022-08-31 DIAGNOSIS — R10.9 RT FLANK PAIN: Primary | ICD-10-CM

## 2022-09-13 ENCOUNTER — HOSPITAL ENCOUNTER (OUTPATIENT)
Dept: RADIOLOGY | Facility: HOSPITAL | Age: 67
Discharge: HOME | End: 2022-09-13
Attending: INTERNAL MEDICINE
Payer: MEDICARE

## 2022-09-13 DIAGNOSIS — R31.29 MICROSCOPIC HEMATURIA: ICD-10-CM

## 2022-09-13 DIAGNOSIS — R10.9 RT FLANK PAIN: ICD-10-CM

## 2022-09-13 DIAGNOSIS — G89.29 PAIN, CHRONIC: ICD-10-CM

## 2022-09-13 PROCEDURE — 74176 CT ABD & PELVIS W/O CONTRAST: CPT | Mod: TC,PO

## 2022-09-21 ENCOUNTER — OFFICE VISIT (OUTPATIENT)
Dept: RADIATION ONCOLOGY | Facility: CLINIC | Age: 67
End: 2022-09-21
Payer: MEDICARE

## 2022-09-21 ENCOUNTER — LAB VISIT (OUTPATIENT)
Dept: LAB | Facility: HOSPITAL | Age: 67
End: 2022-09-21
Attending: RADIOLOGY
Payer: MEDICARE

## 2022-09-21 VITALS
OXYGEN SATURATION: 97 % | RESPIRATION RATE: 18 BRPM | WEIGHT: 259 LBS | SYSTOLIC BLOOD PRESSURE: 149 MMHG | TEMPERATURE: 98 F | DIASTOLIC BLOOD PRESSURE: 74 MMHG | HEART RATE: 82 BPM | BODY MASS INDEX: 34.64 KG/M2

## 2022-09-21 DIAGNOSIS — C61 MALIGNANT NEOPLASM OF PROSTATE: Primary | ICD-10-CM

## 2022-09-21 DIAGNOSIS — C61 MALIGNANT NEOPLASM OF PROSTATE: ICD-10-CM

## 2022-09-21 LAB — COMPLEXED PSA SERPL-MCNC: 0.19 NG/ML (ref 0–4)

## 2022-09-21 PROCEDURE — 3008F PR BODY MASS INDEX (BMI) DOCUMENTED: ICD-10-PCS | Mod: CPTII,S$GLB,, | Performed by: RADIOLOGY

## 2022-09-21 PROCEDURE — 1101F PT FALLS ASSESS-DOCD LE1/YR: CPT | Mod: CPTII,S$GLB,, | Performed by: RADIOLOGY

## 2022-09-21 PROCEDURE — 3078F PR MOST RECENT DIASTOLIC BLOOD PRESSURE < 80 MM HG: ICD-10-PCS | Mod: CPTII,S$GLB,, | Performed by: RADIOLOGY

## 2022-09-21 PROCEDURE — 99214 OFFICE O/P EST MOD 30 MIN: CPT | Mod: S$GLB,,, | Performed by: RADIOLOGY

## 2022-09-21 PROCEDURE — 3288F PR FALLS RISK ASSESSMENT DOCUMENTED: ICD-10-PCS | Mod: CPTII,S$GLB,, | Performed by: RADIOLOGY

## 2022-09-21 PROCEDURE — 3008F BODY MASS INDEX DOCD: CPT | Mod: CPTII,S$GLB,, | Performed by: RADIOLOGY

## 2022-09-21 PROCEDURE — 1125F PR PAIN SEVERITY QUANTIFIED, PAIN PRESENT: ICD-10-PCS | Mod: CPTII,S$GLB,, | Performed by: RADIOLOGY

## 2022-09-21 PROCEDURE — 36415 COLL VENOUS BLD VENIPUNCTURE: CPT | Performed by: RADIOLOGY

## 2022-09-21 PROCEDURE — 1159F MED LIST DOCD IN RCRD: CPT | Mod: CPTII,S$GLB,, | Performed by: RADIOLOGY

## 2022-09-21 PROCEDURE — 3077F PR MOST RECENT SYSTOLIC BLOOD PRESSURE >= 140 MM HG: ICD-10-PCS | Mod: CPTII,S$GLB,, | Performed by: RADIOLOGY

## 2022-09-21 PROCEDURE — 4010F PR ACE/ARB THEARPY RXD/TAKEN: ICD-10-PCS | Mod: CPTII,S$GLB,, | Performed by: RADIOLOGY

## 2022-09-21 PROCEDURE — 1101F PR PT FALLS ASSESS DOC 0-1 FALLS W/OUT INJ PAST YR: ICD-10-PCS | Mod: CPTII,S$GLB,, | Performed by: RADIOLOGY

## 2022-09-21 PROCEDURE — 84153 ASSAY OF PSA TOTAL: CPT | Performed by: RADIOLOGY

## 2022-09-21 PROCEDURE — 3078F DIAST BP <80 MM HG: CPT | Mod: CPTII,S$GLB,, | Performed by: RADIOLOGY

## 2022-09-21 PROCEDURE — 1159F PR MEDICATION LIST DOCUMENTED IN MEDICAL RECORD: ICD-10-PCS | Mod: CPTII,S$GLB,, | Performed by: RADIOLOGY

## 2022-09-21 PROCEDURE — 4010F ACE/ARB THERAPY RXD/TAKEN: CPT | Mod: CPTII,S$GLB,, | Performed by: RADIOLOGY

## 2022-09-21 PROCEDURE — 3288F FALL RISK ASSESSMENT DOCD: CPT | Mod: CPTII,S$GLB,, | Performed by: RADIOLOGY

## 2022-09-21 PROCEDURE — 99214 PR OFFICE/OUTPT VISIT, EST, LEVL IV, 30-39 MIN: ICD-10-PCS | Mod: S$GLB,,, | Performed by: RADIOLOGY

## 2022-09-21 PROCEDURE — 3077F SYST BP >= 140 MM HG: CPT | Mod: CPTII,S$GLB,, | Performed by: RADIOLOGY

## 2022-09-21 PROCEDURE — 1125F AMNT PAIN NOTED PAIN PRSNT: CPT | Mod: CPTII,S$GLB,, | Performed by: RADIOLOGY

## 2022-09-21 NOTE — PROGRESS NOTES
Kel Hammond .  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     HISTORY OF PRESENT ILLNESS:   Mr. Hammond is a 67M who recently p/w a rising PSA and underwent TRUS w/ CNBx that showed Grp2 adenoCA in 2 cores, thus he has now been referred to [facility] for discussion of definitive tx options. Bone scan was (-) for DM's. His insurance did not cover the ordered CT abd/pelvis.  He denied any bony pain, wt loss, hematuria, retention, or incontinence.     Patient completed radiotherapy alone, moderately hypo fractionated 70 Gy in 28 fractions on December 14, 2020.  Patient had mild symptoms of radiation cystitis that were manageable with Flomax twice daily as well as hemorrhoidal irritation requiring preparation H.    INTERVAL HISTORY:     Today patient denies dysuria, hematuria, diarrhea, bright red blood per rectum, bone pain.  He denies urine or stool incontinence.  Remains on Flomax 0.4 mg.    IPSS:               0/35     Mild dysuria  QoL:                0/6       Delighted  Hao:              1/25     Severe ED  [chronic for >10 yrs unchanged]      PSA:  5/29/21 1.1  1/06/22 0.63  9/21/22 0.19    REVIEW OF SYSTEMS:  A complete review of systems was performed and the patient denies any acute concerns/changes other than per HPI    Past Medical History:   Diagnosis Date    DDD (degenerative disc disease), cervical     Diabetes mellitus type II     Prostate cancer      Past Surgical History:   Procedure Laterality Date    JOINT REPLACEMENT      right total knee    right ACL repair       Social History     Socioeconomic History    Marital status:    Occupational History    Occupation: Tech support     Employer: [employer]   Tobacco Use    Smoking status: Never    Smokeless tobacco: Never   Substance and Sexual Activity    Alcohol use: Yes     Comment: social / sometimes     Drug  use: No     Family History   Problem Relation Age of Onset    Diabetes Father     Heart disease Father     Diabetes Mother     Hypertension Mother     Diabetes Sister     Hypertension Sister     No Known Problems Son     Diabetes Maternal Aunt     Heart disease Maternal Aunt     Heart disease Maternal Uncle     Heart disease Paternal Aunt     Heart disease Paternal Uncle         x2    Birth defects Paternal Uncle     Heart disease Maternal Grandmother     Heart disease Maternal Grandfather     Heart disease Paternal Grandmother     Heart disease Paternal Grandfather     Meningitis Brother      Medication List with Changes/Refills   Current Medications    ASPIRIN (ECOTRIN) 81 MG EC TABLET    Take 1 tablet by mouth Daily.    LISINOPRIL 10 MG TABLET    Take 10 mg by mouth once daily. Take 10 mg by mouth once daily.    MULTIVITAMIN (THERAGRAN) PER TABLET    Take 2 tablets by mouth. Take 2 tablets by mouth.    NAPROXEN (NAPROSYN) 250 MG TABLET    Take 250 mg by mouth daily as needed.    TAMSULOSIN (FLOMAX) 0.4 MG CAP    TAKE 1 CAPSULE BY MOUTH EVERY DAY     Review of patient's allergies indicates:  No Known Allergies    QUALITY OF LIFE: 100%- Normal, No Complaints, No Evidence of Disease    Vitals:    09/21/22 1324   BP: (!) 149/74   Pulse: 82   Resp: 18   Temp: 98.2 °F (36.8 °C)   SpO2: 97%   Weight: 117.5 kg (259 lb)   PainSc:   4   PainLoc: Back     Body mass index is 34.64 kg/m².    PHYSICAL EXAM:  GENERAL: alert; in no apparent distress.   HEAD: normocephalic, atraumatic.  EYES: pupils are equal, round, reactive to light and accommodation. Sclera anicteric. Conjunctiva not injected.   NOSE/THROAT: no nasal erythema or rhinorrhea. Oropharynx pink, without erythema, ulcerations or thrush.   NECK: no cervical motion rigidity; supple with no masses.  CHEST: clear to auscultation bilaterally; no wheezes, crackles or rubs. Patient is speaking comfortably on room air with normal work of breathing without using accessory  muscles of respiration.  CARDIOVASCULAR: regular rate and rhythm; no murmurs, rubs or gallops.  ABDOMEN: soft, nontender, nondistended. Bowel sounds present.   MUSCULOSKELETAL: no tenderness to palpation along the spine or scapulae. Normal range of motion.  NEUROLOGIC: cranial nerves II-XII intact bilaterally. Strength 5/5 in bilateral upper and lower extremities. No sensory deficits appreciated. Reflexes globally intact. No cerebellar signs. Normal gait.  LYMPHATIC: no cervical, supraclavicular or axillary adenopathy appreciated bilaterally.   EXTREMITIES: no clubbing, cyanosis, edema.  SKIN: no erythema, rashes, ulcerations noted.       ASSESSMENT: Kel Hammond Sr. is a 67 y.o. male with h/o favorable intermediate-risk prostate adenoCA  [[id number] - Grp2 - PSA: 5.1]    PLAN:   - BEATRICE per exam and continually downward trending PSA  - Repeat PSA in 3m  - RTC in 6m    All questions answered and contact information provided. Patient understands free to call us anytime with any questions or concerns regarding radiation therapy.    I have personally seen and evaluated this patient. Greater than 50% of this time was spent discussing coordination of care and/or counseling.    PHYSICIAN: Jared Mendes III, MD

## 2022-09-22 ENCOUNTER — TELEPHONE (OUTPATIENT)
Dept: RADIATION ONCOLOGY | Facility: CLINIC | Age: 67
End: 2022-09-22

## 2022-09-22 DIAGNOSIS — C61 MALIGNANT NEOPLASM OF PROSTATE: Primary | ICD-10-CM

## 2023-03-10 ENCOUNTER — HOSPITAL ENCOUNTER (EMERGENCY)
Facility: HOSPITAL | Age: 68
Discharge: HOME | End: 2023-03-10
Attending: EMERGENCY MEDICINE
Payer: MEDICARE

## 2023-03-10 VITALS
SYSTOLIC BLOOD PRESSURE: 158 MMHG | TEMPERATURE: 98 F | OXYGEN SATURATION: 96 % | HEIGHT: 74 IN | HEART RATE: 76 BPM | WEIGHT: 265 LBS | DIASTOLIC BLOOD PRESSURE: 86 MMHG | BODY MASS INDEX: 34.01 KG/M2 | RESPIRATION RATE: 18 BRPM

## 2023-03-10 DIAGNOSIS — S61.211A LACERATION OF LEFT INDEX FINGER WITHOUT FOREIGN BODY WITHOUT DAMAGE TO NAIL, INITIAL ENCOUNTER: Primary | ICD-10-CM

## 2023-03-10 PROCEDURE — 90714 TD VACC NO PRESV 7 YRS+ IM: CPT | Performed by: NURSE PRACTITIONER

## 2023-03-10 PROCEDURE — 99283 EMERGENCY DEPT VISIT LOW MDM: CPT | Mod: 25

## 2023-03-10 PROCEDURE — 12001 RPR S/N/AX/GEN/TRNK 2.5CM/<: CPT

## 2023-03-10 PROCEDURE — 63600175 PHARM REV CODE 636 W HCPCS: Performed by: NURSE PRACTITIONER

## 2023-03-10 PROCEDURE — 90471 IMMUNIZATION ADMIN: CPT | Performed by: NURSE PRACTITIONER

## 2023-03-10 PROCEDURE — 25000003 PHARM REV CODE 250: Performed by: NURSE PRACTITIONER

## 2023-03-10 RX ORDER — LIDOCAINE HYDROCHLORIDE 10 MG/ML
10 INJECTION, SOLUTION EPIDURAL; INFILTRATION; INTRACAUDAL; PERINEURAL
Status: COMPLETED | OUTPATIENT
Start: 2023-03-10 | End: 2023-03-10

## 2023-03-10 RX ADMIN — LIDOCAINE HYDROCHLORIDE 100 MG: 10 INJECTION, SOLUTION EPIDURAL; INFILTRATION; INTRACAUDAL; PERINEURAL at 02:03

## 2023-03-10 RX ADMIN — TETANUS AND DIPHTHERIA TOXOIDS ADSORBED 0.5 ML: 2; 2 INJECTION INTRAMUSCULAR at 01:03

## 2023-03-10 NOTE — DISCHARGE INSTRUCTIONS
Keep this wound clean and dry.  Return to the ED or your primary care in 10 days for suture removal.  Return to the ED for any worsening of symptoms or any other concerns.  I have enjoyed caring for you

## 2023-03-10 NOTE — ED PROVIDER NOTES
Encounter Date: 3/10/2023       History     Chief Complaint   Patient presents with    Laceration     Pt has laceration to left index finger from an exacto knife. Went to urgent care and they said they couldn't help him.      Presents with laceration left index finger.  Patient reports he cut it on an Exacto knife.  Bleeding has been controlled.  Patient states he is gone to urgent care and they could not help him so he came here.  States his last tetanus was around [place].    Review of patient's allergies indicates:  No Known Allergies  Past Medical History:   Diagnosis Date    DDD (degenerative disc disease), cervical     Diabetes mellitus type II     Prostate cancer      Past Surgical History:   Procedure Laterality Date    JOINT REPLACEMENT      right total knee    right ACL repair       Family History   Problem Relation Age of Onset    Diabetes Father     Heart disease Father     Diabetes Mother     Hypertension Mother     Diabetes Sister     Hypertension Sister     No Known Problems Son     Diabetes Maternal Aunt     Heart disease Maternal Aunt     Heart disease Maternal Uncle     Heart disease Paternal Aunt     Heart disease Paternal Uncle         x2    Birth defects Paternal Uncle     Heart disease Maternal Grandmother     Heart disease Maternal Grandfather     Heart disease Paternal Grandmother     Heart disease Paternal Grandfather     Meningitis Brother      Social History     Tobacco Use    Smoking status: Never    Smokeless tobacco: Never   Substance Use Topics    Alcohol use: Yes     Comment: social / sometimes     Drug use: No     Review of Systems   Constitutional:  Negative for fever.   Respiratory:  Negative for cough, shortness of breath and wheezing.    Cardiovascular:  Negative for chest pain, palpitations and leg swelling.   Gastrointestinal:  Negative for abdominal pain, diarrhea, nausea and vomiting.   Skin:  Negative for rash.        Laceration left index finger   Neurological:  Negative  for weakness.     Physical Exam     Initial Vitals [03/10/23 1325]   BP Pulse Resp Temp SpO2   (!) 158/86 76 18 98.4 °F (36.9 °C) 96 %      MAP       --         Physical Exam    Constitutional: He appears well-developed and well-nourished.   HENT:   Mouth/Throat: Oropharynx is clear and moist.   Eyes: Conjunctivae are normal.   Neck: Neck supple.   Normal range of motion.  Cardiovascular:  Normal rate and regular rhythm.           Pulmonary/Chest: Breath sounds normal. No respiratory distress.   Musculoskeletal:         General: Normal range of motion.      Cervical back: Normal range of motion and neck supple.      Comments: There is a laceration to the left index finger.  Patient has full range of motion to that finger.  There is no evidence of a tendon laceration.  There is no nail involvement.     Neurological: He is alert and oriented to person, place, and time. No sensory deficit. GCS score is 15. GCS eye subscore is 4. GCS verbal subscore is 5. GCS motor subscore is 6.   Skin: Skin is warm. Capillary refill takes less than 2 seconds. No erythema.   Laceration to left index finger.  Bleeding is controlled.  There is no signs or symptoms of infection.   Psychiatric: He has a normal mood and affect. Thought content normal.       ED Course   Lac Repair    Date/Time: 3/10/2023 2:29 PM  Performed by: Ruth Castro NP  Authorized by: Ruth Castro NP     Consent:     Consent obtained:  Verbal    Consent given by:  Patient  Universal protocol:     Patient identity confirmed:  Arm band  Laceration details:     Location:  Finger    Finger location:  L index finger    Length (cm):  2  Pre-procedure details:     Preparation:  Patient was prepped and draped in usual sterile fashion  Exploration:     Imaging outcome: foreign body not noted      Contaminated: no    Treatment:     Area cleansed with:  Povidone-iodine    Amount of cleaning:  Standard    Visualized foreign bodies/material removed: no       Debridement:  None    Undermining:  None  Skin repair:     Repair method:  Sutures    Suture size:  4-0    Suture material:  Nylon    Suture technique:  Simple interrupted    Number of sutures:  4  Approximation:     Approximation:  Close  Labs Reviewed - No data to display       Imaging Results    None          Medications   LIDOcaine (PF) 10 mg/ml (1%) injection 100 mg (100 mg Infiltration Given 3/10/23 1401)   diptheria-tetanus toxoids 2-2 Lf unit/0.5 mL injection 0.5 mL ( Intramuscular Canceled Entry 3/10/23 1500)     Medical Decision Making:   Initial Assessment:   Presents with complaint of aspiration of the left index finger.  Patient was using an Exacto knife and cut himself.  Last tetanus shot was around [place].  ED Management:  Tetanus injection given.  Four sutures were placed to the left index finger.  Patient tolerated this procedure very well.  He was given return precautions for suture removal in 10 days here or at his primary care doctor.  A Band-Aid was applied.  Patient was instructed to keep it clean and dry.  At no time while in the ED did this patient appear to be in any acute distress.  I have given him return precautions.  Have discussed this patient with Dr. Winslow          Attending Attestation:     Physician Attestation Statement for NP/PA:       Other NP/PA Attestation Additions:    History of Present Illness: I was not called upon to see this patient but was available for consultation                           Clinical Impression:   Final diagnoses:  [S61.211A] Laceration of left index finger without foreign body without damage to nail, initial encounter (Primary)        ED Disposition Condition    Discharge Stable          ED Prescriptions    None       Follow-up Information       Follow up With Specialties Details Why Contact Info    Gail Bales MD Hospitalist, Internal Medicine In 10 days  [address]  [address]  [facility] [address]  [phone]               Ruth BRUNER  ANNA Castro  03/10/23 1434       Jeison Winslow MD  03/10/23 8954

## 2023-03-27 ENCOUNTER — LAB VISIT (OUTPATIENT)
Dept: LAB | Facility: HOSPITAL | Age: 68
End: 2023-03-27
Attending: RADIOLOGY
Payer: MEDICARE

## 2023-03-27 ENCOUNTER — OFFICE VISIT (OUTPATIENT)
Dept: RADIATION ONCOLOGY | Facility: CLINIC | Age: 68
End: 2023-03-27
Payer: MEDICARE

## 2023-03-27 DIAGNOSIS — C61 MALIGNANT NEOPLASM OF PROSTATE: ICD-10-CM

## 2023-03-27 DIAGNOSIS — C61 MALIGNANT NEOPLASM OF PROSTATE: Primary | ICD-10-CM

## 2023-03-27 LAB — COMPLEXED PSA SERPL-MCNC: 0.19 NG/ML (ref 0–4)

## 2023-03-27 PROCEDURE — 84153 ASSAY OF PSA TOTAL: CPT | Performed by: RADIOLOGY

## 2023-03-27 PROCEDURE — 36415 COLL VENOUS BLD VENIPUNCTURE: CPT | Performed by: RADIOLOGY

## 2023-03-27 NOTE — PROGRESS NOTES
Kel Hammond .  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     HISTORY OF PRESENT ILLNESS:   Mr. Hammond is a 68M who recently p/w a rising PSA and underwent TRUS w/ CNBx that showed Grp2 adenoCA in 2 cores, thus he has now been referred to [facility] for discussion of definitive tx options. Bone scan was (-) for DM's. His insurance did not cover the ordered CT abd/pelvis.  He denied any bony pain, wt loss, hematuria, retention, or incontinence.     Patient completed radiotherapy alone, moderately hypo fractionated 70 Gy in 28 fractions on December 14, 2020.  Patient had mild symptoms of radiation cystitis that were manageable with Flomax twice daily as well as hemorrhoidal irritation requiring preparation H.    INTERVAL HISTORY:     Today patient denies dysuria, hematuria, diarrhea, bright red blood per rectum, bone pain.  He denies urine or stool incontinence.  Remains on Flomax 0.4 mg.     IPSS:               /35     Mild dysuria  QoL:                /6       Delighted  Hoa:              /25     Severe ED  [chronic for >10 yrs unchanged]        PSA:  5/29/21            1.1  1/06/22            0.63  9/21/22            0.19      REVIEW OF SYSTEMS:  A complete review of systems was performed and the patient denies any acute concerns/changes other than per HPI    Past Medical History:   Diagnosis Date    DDD (degenerative disc disease), cervical     Diabetes mellitus type II     Prostate cancer      Past Surgical History:   Procedure Laterality Date    JOINT REPLACEMENT      right total knee    right ACL repair       Social History     Socioeconomic History    Marital status:    Occupational History    Occupation: Tech support     Employer: [employer]   Tobacco Use    Smoking status: Never    Smokeless tobacco: Never   Substance and Sexual Activity    Alcohol use: Yes      Comment: social / sometimes     Drug use: No     Family History   Problem Relation Age of Onset    Diabetes Father     Heart disease Father     Diabetes Mother     Hypertension Mother     Diabetes Sister     Hypertension Sister     No Known Problems Son     Diabetes Maternal Aunt     Heart disease Maternal Aunt     Heart disease Maternal Uncle     Heart disease Paternal Aunt     Heart disease Paternal Uncle         x2    Birth defects Paternal Uncle     Heart disease Maternal Grandmother     Heart disease Maternal Grandfather     Heart disease Paternal Grandmother     Heart disease Paternal Grandfather     Meningitis Brother      Medication List with Changes/Refills   Current Medications    ASPIRIN (ECOTRIN) 81 MG EC TABLET    Take 1 tablet by mouth Daily.    LISINOPRIL 10 MG TABLET    Take 10 mg by mouth once daily. Take 10 mg by mouth once daily.    MULTIVITAMIN (THERAGRAN) PER TABLET    Take 2 tablets by mouth. Take 2 tablets by mouth.    NAPROXEN (NAPROSYN) 250 MG TABLET    Take 250 mg by mouth daily as needed.    TAMSULOSIN (FLOMAX) 0.4 MG CAP    TAKE 1 CAPSULE BY MOUTH EVERY DAY     Review of patient's allergies indicates:  No Known Allergies    QUALITY OF LIFE: {KARNOFSKY SCORE:08993}    There were no vitals filed for this visit.  There is no height or weight on file to calculate BMI.    PHYSICAL EXAM:  GENERAL: alert; in no apparent distress.   HEAD: normocephalic, atraumatic.  EYES: pupils are equal, round, reactive to light and accommodation. Sclera anicteric. Conjunctiva not injected.   NOSE/THROAT: no nasal erythema or rhinorrhea. Oropharynx pink, without erythema, ulcerations or thrush.   NECK: no cervical motion rigidity; supple with no masses.  CHEST: clear to auscultation bilaterally; no wheezes, crackles or rubs. Patient is speaking comfortably on room air with normal work of breathing without using accessory muscles of respiration.  CARDIOVASCULAR: regular rate and rhythm; no murmurs, rubs or  gallops.  ABDOMEN: soft, nontender, nondistended. Bowel sounds present.   MUSCULOSKELETAL: no tenderness to palpation along the spine or scapulae. Normal range of motion.  NEUROLOGIC: cranial nerves II-XII intact bilaterally. Strength 5/5 in bilateral upper and lower extremities. No sensory deficits appreciated. Reflexes globally intact. No cerebellar signs. Normal gait.  LYMPHATIC: no cervical, supraclavicular or axillary adenopathy appreciated bilaterally.   EXTREMITIES: no clubbing, cyanosis, edema.  SKIN: no erythema, rashes, ulcerations noted.       ASSESSMENT: Kel Hammond Sr. is a 68 y.o. male with h/o favorable intermediate-risk prostate adenoCA  [[id number] - Grp2 - PSA: 5.1]    PLAN:       All questions answered and contact information provided. Patient understands free to call us anytime with any questions or concerns regarding radiation therapy.    I have personally seen and evaluated this patient. Greater than 50% of this time was spent discussing coordination of care and/or counseling.    PHYSICIAN: Jared Mendes III, MD

## 2023-04-25 ENCOUNTER — PATIENT MESSAGE (OUTPATIENT)
Dept: RESEARCH | Facility: HOSPITAL | Age: 68
End: 2023-04-25
Payer: COMMERCIAL

## 2023-05-02 ENCOUNTER — PATIENT MESSAGE (OUTPATIENT)
Dept: RESEARCH | Facility: HOSPITAL | Age: 68
End: 2023-05-02
Payer: COMMERCIAL

## 2023-05-16 ENCOUNTER — PATIENT MESSAGE (OUTPATIENT)
Dept: RESEARCH | Facility: HOSPITAL | Age: 68
End: 2023-05-16
Payer: COMMERCIAL